# Patient Record
(demographics unavailable — no encounter records)

---

## 2024-11-20 NOTE — REASON FOR VISIT
[Other: ____] : [unfilled] [FreeTextEntry1] : Diagnostic Tests: ------------------------ EK24-NSR. LVH. Incomplete RBBB.  24-NSR. LVH.  23-NSR. LVH. Incomplete RBBB.   23-Atrial pacing with NSR. LVH.  10/20/23-AV paced.  ------------------------ Echo:  24-TTE: EF 61%. Mild AS (PV 2.02 m/s, 1.81cm2). AoR 3.7 cm, Asc Ao 3.8 cm. PASP 22 mmHg.  10/17/23-TTE: EF 67%. Grade I DD. Mild AS. Trace MR. Mild TR. AoR 3.7 cm. PASP 40.5 mmHg.

## 2024-11-20 NOTE — HISTORY OF PRESENT ILLNESS
[FreeTextEntry1] : Mr. Parnell is a 79yo M with PMHx of CAD s/p PCI, HTN, HLD, RA, CHB s/p PPM who presents for follow up. His PMD is Dr. Leonardo Castelan. He recently was hospitalized at [facility] for dizziness and near syncope. He was found to have 2:1 AVB and underwent PPM placement. He was also found to have a meningioma. He is feeling tired. He previously followed with Dr. Allan Kohli ([facility]) and is switching care to .  12/28/23-Patinet is going for submandibular gland removal 01/17/24 with Dr. Antunez. He had another episode of lightheadedness and dizziness with near syncope, but he hadn't eaten that day. PCI was in 2015. Denies CP, SOB, HALLMAN, LE edema.  05/22/24-Patient feeling well. He is starting PT. He had COVID after his procedure.  11/20/24-Patient feeling well overall. PPM revealed NSVT and pSVT. He denies symptoms.

## 2024-11-20 NOTE — PHYSICAL EXAM
[Well Developed] : well developed [Well Nourished] : well nourished [No Acute Distress] : no acute distress [Normal Conjunctiva] : normal conjunctiva [Normal Venous Pressure] : normal venous pressure [No Carotid Bruit] : no carotid bruit [5th Left ICS - MCL] : palpated at the 5th LICS in the midclavicular line [Normal] : normal [No Precordial Heave] : no precordial heave was noted [Normal Rate] : normal [Rhythm Regular] : regular [Normal S1] : normal S1 [Normal S2] : normal S2 [No Murmur] : no murmurs heard [No Pitting Edema] : no pitting edema present [2+] : left 2+ [Clear Lung Fields] : clear lung fields [Good Air Entry] : good air entry [No Respiratory Distress] : no respiratory distress  [Soft] : abdomen soft [Non Tender] : non-tender [No Masses/organomegaly] : no masses/organomegaly [Normal Bowel Sounds] : normal bowel sounds [Normal Gait] : normal gait [No Edema] : no edema [No Cyanosis] : no cyanosis [No Clubbing] : no clubbing [No Varicosities] : no varicosities [No Rash] : no rash [No Skin Lesions] : no skin lesions [Moves all extremities] : moves all extremities [No Focal Deficits] : no focal deficits [Normal Speech] : normal speech [Alert and Oriented] : alert and oriented [Normal memory] : normal memory [de-identified] : Left upper chest wall with well healing incision. Wound CDI.

## 2024-11-20 NOTE — ASSESSMENT
[FreeTextEntry1] : CHB s/p PPM:  -Continue to follow with EP for interrogation.   CAD: S/P PCI -Continue with ASA, Plavix, atorvastatin 40mg PO, and ezetimibe 10mg PO daily.  -Will consider adding metoprolol to regimen now s/p PPM if having additional symptoms.  HLD: , , HDL 44, LDL 76 (03/24)->, , HDL 45, LDL 65 (09/24) -Continue with atorvastatin 40mg PO and ezetimibe 10mg PO daily. -If LDL >70, will need to increase atorvastatin.  -Discussed therapeutic lifestyle changes to promote improved lipid metabolism.   Meningioma: -No intervention per neurosurgery.   RA: Well controlled  Mild AS: -Stable AS.  Follow up in 6 months

## 2024-11-20 NOTE — HISTORY OF PRESENT ILLNESS
[FreeTextEntry1] : Mr. Parnell is a 81yo M with PMHx of CAD s/p PCI, HTN, HLD, RA, CHB s/p PPM who presents for follow up. His PMD is Dr. Leonardo Castelan. He recently was hospitalized at [facility] for dizziness and near syncope. He was found to have 2:1 AVB and underwent PPM placement. He was also found to have a meningioma. He is feeling tired. He previously followed with Dr. Allan Kohli ([facility]) and is switching care to .  12/28/23-Patinet is going for submandibular gland removal 01/17/24 with Dr. Antunez. He had another episode of lightheadedness and dizziness with near syncope, but he hadn't eaten that day. PCI was in 2015. Denies CP, SOB, HALLMAN, LE edema.  05/22/24-Patient feeling well. He is starting PT. He had COVID after his procedure.  11/20/24-Patient feeling well overall. PPM revealed NSVT and pSVT. He denies symptoms.

## 2024-11-20 NOTE — PHYSICAL EXAM
[Well Developed] : well developed [Well Nourished] : well nourished [No Acute Distress] : no acute distress [Normal Conjunctiva] : normal conjunctiva [Normal Venous Pressure] : normal venous pressure [No Carotid Bruit] : no carotid bruit [5th Left ICS - MCL] : palpated at the 5th LICS in the midclavicular line [Normal] : normal [No Precordial Heave] : no precordial heave was noted [Normal Rate] : normal [Rhythm Regular] : regular [Normal S1] : normal S1 [Normal S2] : normal S2 [No Murmur] : no murmurs heard [No Pitting Edema] : no pitting edema present [2+] : left 2+ [Clear Lung Fields] : clear lung fields [Good Air Entry] : good air entry [No Respiratory Distress] : no respiratory distress  [Soft] : abdomen soft [Non Tender] : non-tender [No Masses/organomegaly] : no masses/organomegaly [Normal Bowel Sounds] : normal bowel sounds [Normal Gait] : normal gait [No Edema] : no edema [No Cyanosis] : no cyanosis [No Clubbing] : no clubbing [No Varicosities] : no varicosities [No Rash] : no rash [No Skin Lesions] : no skin lesions [Moves all extremities] : moves all extremities [No Focal Deficits] : no focal deficits [Normal Speech] : normal speech [Alert and Oriented] : alert and oriented [Normal memory] : normal memory [de-identified] : Left upper chest wall with well healing incision. Wound CDI.

## 2025-01-27 NOTE — REASON FOR VISIT
Edema - and leg swelling dur to venous insufficiency - responding to low dose Furosemide and recommend: leg elevation and compression stockings -     [___ Device Check] : is here today for a [unfilled] device check for [Family Member] : family member

## 2025-01-27 NOTE — PHYSICAL EXAM
[General Appearance - Well Developed] : well developed [Normal Appearance] : normal appearance [Well Groomed] : well groomed [General Appearance - Well Nourished] : well nourished [No Deformities] : no deformities [General Appearance - In No Acute Distress] : no acute distress [Heart Rate And Rhythm] : heart rate and rhythm were normal [Heart Sounds] : normal S1 and S2 [Murmurs] : no murmurs present [Arterial Pulses Normal] : the arterial pulses were normal [] : no respiratory distress [Respiration, Rhythm And Depth] : normal respiratory rhythm and effort [Exaggerated Use Of Accessory Muscles For Inspiration] : no accessory muscle use [Auscultation Breath Sounds / Voice Sounds] : lungs were clear to auscultation bilaterally [Left Infraclavicular] : left infraclavicular area [Clean] : clean [Dry] : dry [Well-Healed] : well-healed [Abdomen Soft] : soft [Nail Clubbing] : no clubbing of the fingernails [Cyanosis, Localized] : no localized cyanosis

## 2025-02-05 NOTE — HISTORY OF PRESENT ILLNESS
[de-identified] : PCP: Dr. Castelan Cardio: Dr. Corcoran ENT: Dr. Antunez  80y Male with HTN, HLD, CAD with 3 stent ([facility]) 2015, b/l knee replacement with recurrent sepsis, replaced x3, last 2/2020, s/p DC PPM implant.  He presents for routine follow up.  The patient is feeling well and has no cardiac complaints. Denies CP, palpitations, SOB, dizziness, HALLMAN, PND, syncope.

## 2025-02-05 NOTE — HISTORY OF PRESENT ILLNESS
[de-identified] : PCP: Dr. Castelan Cardio: Dr. Corcoran ENT: Dr. Antunez  80y Male with HTN, HLD, CAD with 3 stent ([facility]) 2015, b/l knee replacement with recurrent sepsis, replaced x3, last 2/2020, s/p DC PPM implant.  He presents for routine follow up.  The patient is feeling well and has no cardiac complaints. Denies CP, palpitations, SOB, dizziness, HALLMAN, PND, syncope.

## 2025-02-05 NOTE — ASSESSMENT
[FreeTextEntry1] : s/p DC PPM implant  - Device interrogation normal. I interrogated and reprogrammed the device as described above.  - Several fast A&V episodes are c/w SVT/SVT, all <1 minute. undersensing in A - The patient is on remote and transmitting.   NSVT /PSVT - Metoprolol succ 25mg daily - Stress echo last year WNL per note from Dr. Kohil. - Consider nuclear stress test if episodes recur.   - Continue to monitor.   RTO in 6-9 months

## 2025-02-05 NOTE — CARDIOLOGY SUMMARY
[de-identified] : 11/20/2023: sinus rhythm 80 bpm, 1 AVB, LAD [de-identified] : 10/17/2023:   1. Normal global left ventricular systolic function.  2. LV Ejection Fraction by Taylor's Method with a biplane EF of 67 %.  3. Spectral Doppler shows impaired relaxation pattern of left  ventricular myocardial filling (Grade I diastolic dysfunction).  4. Normal right ventricular size and function.  5. Normal left atrial size.  6. Normal right atrial size.  7. Mild aortic valve stenosis.  8. Mild thickening and calcification of the anterior and posterior  mitral valve leaflets.  9. Trace mitral valve regurgitation. 10. Mild tricuspid regurgitation. 11. Dilatation of the aortic root. 12. Estimated pulmonary artery systolic pressure is 40.5 mmHg assuming a  right atrial pressure of 3 mmHg, which is consistent with mild pulmonary  hypertension. 13. There is no evidence of pericardial effusion.  Stress echo 8/2023: no ischemia.   [de-identified] : 10/16/2023:  1.  No evidence of major vascular stenosis or occlusion. Normal perfusion  images.  2.  Enhancing lesion centered at the left cavernous sinus measuring up to  4.1 cm probably reflecting meningioma, with encroachment of the left  orbital apex and sella and encasement of the left ICA without significant  stenosis.  3.  Rim-enhancing lesions within the submandibular glands measuring 1.4  cm on the right and 0.6 cm on the left. Recommend ENT follow-up on an  outpatient/elective basis.  4.  Cystic lesion at the right tongue base measuring 6 mm. This may also  be assessed with ENT follow-up on an outpatient/elective basis.

## 2025-02-05 NOTE — PROCEDURE
[No] : not [NSR] : normal sinus rhythm [See Device Printout] : See device printout [Pacemaker] : pacemaker [DDD] : DDD [Lead Imp:  ___ohms] : lead impedance was [unfilled] ohms [Sensing Amplitude ___mv] : sensing amplitude was [unfilled] mv [___V @] : [unfilled] V [___ ms] : [unfilled] ms [Apace-Vpace ___ %] : Apace-Vpace [unfilled]% [Longevity: ___ months] : The estimated remaining battery life is [unfilled] months [de-identified] : SANGEETA Singh XT MRI [de-identified] : W1DR01 [de-identified] : [id number] [de-identified] : 10/19/2023 [de-identified] : 60/130 [de-identified] : 13.5 years [de-identified] : No events AP: 18.8% : 16.7% 6 fast A&V episodes c/w ST/SVT all <1 minute

## 2025-02-05 NOTE — PROCEDURE
[No] : not [NSR] : normal sinus rhythm [See Device Printout] : See device printout [Pacemaker] : pacemaker [DDD] : DDD [Lead Imp:  ___ohms] : lead impedance was [unfilled] ohms [Sensing Amplitude ___mv] : sensing amplitude was [unfilled] mv [___V @] : [unfilled] V [___ ms] : [unfilled] ms [Apace-Vpace ___ %] : Apace-Vpace [unfilled]% [Longevity: ___ months] : The estimated remaining battery life is [unfilled] months [de-identified] : SANGEETA Singh XT MRI [de-identified] : W1DR01 [de-identified] : [id number] [de-identified] : 10/19/2023 [de-identified] : 60/130 [de-identified] : 13.5 years [de-identified] : No events AP: 18.8% : 16.7% 6 fast A&V episodes c/w ST/SVT all <1 minute

## 2025-02-05 NOTE — CARDIOLOGY SUMMARY
[de-identified] : 11/20/2023: sinus rhythm 80 bpm, 1 AVB, LAD [de-identified] : 10/17/2023:   1. Normal global left ventricular systolic function.  2. LV Ejection Fraction by Taylor's Method with a biplane EF of 67 %.  3. Spectral Doppler shows impaired relaxation pattern of left  ventricular myocardial filling (Grade I diastolic dysfunction).  4. Normal right ventricular size and function.  5. Normal left atrial size.  6. Normal right atrial size.  7. Mild aortic valve stenosis.  8. Mild thickening and calcification of the anterior and posterior  mitral valve leaflets.  9. Trace mitral valve regurgitation. 10. Mild tricuspid regurgitation. 11. Dilatation of the aortic root. 12. Estimated pulmonary artery systolic pressure is 40.5 mmHg assuming a  right atrial pressure of 3 mmHg, which is consistent with mild pulmonary  hypertension. 13. There is no evidence of pericardial effusion.  Stress echo 8/2023: no ischemia.   [de-identified] : 10/16/2023:  1.  No evidence of major vascular stenosis or occlusion. Normal perfusion  images.  2.  Enhancing lesion centered at the left cavernous sinus measuring up to  4.1 cm probably reflecting meningioma, with encroachment of the left  orbital apex and sella and encasement of the left ICA without significant  stenosis.  3.  Rim-enhancing lesions within the submandibular glands measuring 1.4  cm on the right and 0.6 cm on the left. Recommend ENT follow-up on an  outpatient/elective basis.  4.  Cystic lesion at the right tongue base measuring 6 mm. This may also  be assessed with ENT follow-up on an outpatient/elective basis.

## 2025-02-05 NOTE — ASSESSMENT
[FreeTextEntry1] : s/p DC PPM implant  - Device interrogation normal. I interrogated and reprogrammed the device as described above.  - Several fast A&V episodes are c/w SVT/SVT, all <1 minute. undersensing in A - The patient is on remote and transmitting.   NSVT /PSVT - Metoprolol succ 25mg daily - Stress echo last year WNL per note from Dr. Kohli. - Consider nuclear stress test if episodes recur.   - Continue to monitor.   RTO in 6-9 months

## 2025-04-18 NOTE — REASON FOR VISIT
[Other: ____] : [unfilled] [FreeTextEntry1] : Diagnostic Tests: ------------------------ EK25-NSR. LVH. PRWP.  24-NSR. LVH. Incomplete RBBB.  24-NSR. LVH.  23-NSR. LVH. Incomplete RBBB.   23-Atrial pacing with NSR. LVH.  10/20/23-AV paced.  ------------------------ Echo:  24-TTE: EF 61%. Mild AS (PV 2.02 m/s, 1.81cm2). AoR 3.7 cm, Asc Ao 3.8 cm. PASP 22 mmHg.  10/17/23-TTE: EF 67%. Grade I DD. Mild AS. Trace MR. Mild TR. AoR 3.7 cm. PASP 40.5 mmHg.

## 2025-04-18 NOTE — ASSESSMENT
[FreeTextEntry1] : Preprocedural cardiac risk assessment: Patient without ACS, acute HF or unstable arrhythmia. Able to perform >4 METS.  -Patient is intermediate risk for low risk procedure.  -Patient should continue with ASA and Plavix.  -Can hold for 5 days prior if needed.  -Continue other cardiac meds.   CHB s/p PPM:  -Continue to follow with EP for interrogation.   CAD: S/P PCI -Continue with ASA, Plavix, Toprol 25mg PO daily, atorvastatin 40mg PO, and ezetimibe 10mg PO daily.   HLD: , , HDL 44, LDL 76 (03/24)->, , HDL 45, LDL 65 (09/24) -Continue with atorvastatin 40mg PO and ezetimibe 10mg PO daily. -If LDL >70, will need to increase atorvastatin.  -Discussed therapeutic lifestyle changes to promote improved lipid metabolism.   Meningioma: -No intervention per neurosurgery.   RA: Well controlled  Mild AS: -Stable AS.  Follow up in 6 months

## 2025-04-18 NOTE — PHYSICAL EXAM
[Well Developed] : well developed [Well Nourished] : well nourished [No Acute Distress] : no acute distress [Normal Conjunctiva] : normal conjunctiva [Normal Venous Pressure] : normal venous pressure [No Carotid Bruit] : no carotid bruit [5th Left ICS - MCL] : palpated at the 5th LICS in the midclavicular line [Normal] : normal [No Precordial Heave] : no precordial heave was noted [Normal Rate] : normal [Rhythm Regular] : regular [Normal S1] : normal S1 [Normal S2] : normal S2 [No Murmur] : no murmurs heard [No Pitting Edema] : no pitting edema present [2+] : left 2+ [Clear Lung Fields] : clear lung fields [Good Air Entry] : good air entry [No Respiratory Distress] : no respiratory distress  [Soft] : abdomen soft [Non Tender] : non-tender [No Masses/organomegaly] : no masses/organomegaly [Normal Bowel Sounds] : normal bowel sounds [Normal Gait] : normal gait [No Edema] : no edema [No Cyanosis] : no cyanosis [No Clubbing] : no clubbing [No Varicosities] : no varicosities [No Rash] : no rash [No Skin Lesions] : no skin lesions [Moves all extremities] : moves all extremities [No Focal Deficits] : no focal deficits [Normal Speech] : normal speech [Alert and Oriented] : alert and oriented [Normal memory] : normal memory [de-identified] : Left upper chest wall with well healing incision. Wound CDI.

## 2025-04-18 NOTE — HISTORY OF PRESENT ILLNESS
[FreeTextEntry1] : Mr. Parnell is a 79yo M with PMHx of CAD s/p PCI, HTN, HLD, RA, CHB s/p PPM who presents for follow up. His PMD is Dr. Leonardo Castelan. He recently was hospitalized at [facility] for dizziness and near syncope. He was found to have 2:1 AVB and underwent PPM placement. He was also found to have a meningioma. He is feeling tired. He previously followed with Dr. Allan Kohli ([facility]) and is switching care to .  12/28/23-Patinet is going for submandibular gland removal 01/17/24 with Dr. Antunez. He had another episode of lightheadedness and dizziness with near syncope, but he hadn't eaten that day. PCI was in 2015. Denies CP, SOB, HALLMAN, LE edema.  05/22/24-Patient feeling well. He is starting PT. He had COVID after his procedure.  11/20/24-Patient feeling well overall. PPM revealed NSVT and pSVT. He denies symptoms.  04/28/15-Patient feeling well. He is going for cataract procedure on 05/05/25 and 05/19/25 with Dr. Sage.

## 2025-06-14 NOTE — OBJECTIVE
[History reviewed] : History reviewed. [Medications and Allergies reviewed] : Medications and allergies reviewed. 8

## 2025-07-28 NOTE — PROCEDURE
[No] : not [NSR] : normal sinus rhythm [See Device Printout] : See device printout [Pacemaker] : pacemaker [DDD] : DDD [Longevity: ___ months] : The estimated remaining battery life is [unfilled] months [Lead Imp:  ___ohms] : lead impedance was [unfilled] ohms [Sensing Amplitude ___mv] : sensing amplitude was [unfilled] mv [___V @] : [unfilled] V [___ ms] : [unfilled] ms [de-identified] : SANGEETA Singh XT MRI [de-identified] : W1DR01 [de-identified] : [id number] [de-identified] : 10/19/2023 [de-identified] : 60/130 [de-identified] : 12.8years [de-identified] : No events AP: 32.2% : 33.6%

## 2025-07-28 NOTE — HISTORY OF PRESENT ILLNESS
[de-identified] : PCP: Dr. Castelan Cardio: Dr. Corcoran ENT: Dr. Antunez  80y Male with HTN, HLD, CAD with 3 stent ([facility]) 2015, b/l knee replacement with recurrent sepsis, replaced x3, last 2/2020, s/p DC PPM implant.  He presents for routine follow up.  The patient is feeling well and has no cardiac complaints. Denies CP, palpitations, SOB, dizziness, HALLMAN, PND, syncope.

## 2025-07-28 NOTE — ASSESSMENT
[FreeTextEntry1] : s/p DC PPM implant for high degree AVB Denies any sob, HALLMAN, PND, orthopnea, no palpitations, no dizziness, lightheadedness, denies chest pains Walks with a rollator.   - Device interrogation normal. I interrogated and reprogrammed the device as described above.  1 nsvt episode for 4 secs @ 196 bpm - - The patient is on remote and transmitting.   NSVT - asymptomatic - Metoprolol succ 25mg daily - Communicated with Dr. Low - for possible ischemic w/up - patient will have echo in October  BP is low normal he feels tired- claimed not able to sleep good last night d/t stomach is bothering him.  - Continue to monitor.   RTO in 6-9 months

## 2025-07-28 NOTE — CARDIOLOGY SUMMARY
[de-identified] : 11/20/2023: sinus rhythm 80 bpm, 1 AVB, LAD [de-identified] : 2024???? [de-identified] : 10/17/2023:   1. Normal global left ventricular systolic function.  2. LV Ejection Fraction by Taylor's Method with a biplane EF of 67 %.  3. Spectral Doppler shows impaired relaxation pattern of left  ventricular myocardial filling (Grade I diastolic dysfunction).  4. Normal right ventricular size and function.  5. Normal left atrial size.  6. Normal right atrial size.  7. Mild aortic valve stenosis.  8. Mild thickening and calcification of the anterior and posterior  mitral valve leaflets.  9. Trace mitral valve regurgitation. 10. Mild tricuspid regurgitation. 11. Dilatation of the aortic root. 12. Estimated pulmonary artery systolic pressure is 40.5 mmHg assuming a  right atrial pressure of 3 mmHg, which is consistent with mild pulmonary  hypertension. 13. There is no evidence of pericardial effusion.  Stress echo 8/2023: no ischemia.   [de-identified] : 10/16/2023:  1.  No evidence of major vascular stenosis or occlusion. Normal perfusion  images.  2.  Enhancing lesion centered at the left cavernous sinus measuring up to  4.1 cm probably reflecting meningioma, with encroachment of the left  orbital apex and sella and encasement of the left ICA without significant  stenosis.  3.  Rim-enhancing lesions within the submandibular glands measuring 1.4  cm on the right and 0.6 cm on the left. Recommend ENT follow-up on an  outpatient/elective basis.  4.  Cystic lesion at the right tongue base measuring 6 mm. This may also  be assessed with ENT follow-up on an outpatient/elective basis. [de-identified] : 2015